# Patient Record
Sex: FEMALE | Race: AMERICAN INDIAN OR ALASKA NATIVE
[De-identification: names, ages, dates, MRNs, and addresses within clinical notes are randomized per-mention and may not be internally consistent; named-entity substitution may affect disease eponyms.]

---

## 2020-07-04 ENCOUNTER — HOSPITAL ENCOUNTER (EMERGENCY)
Dept: HOSPITAL 46 - ED | Age: 25
Discharge: LEFT BEFORE DISCHARGE | End: 2020-07-04
Payer: COMMERCIAL

## 2020-07-04 VITALS — BODY MASS INDEX: 24.83 KG/M2 | HEIGHT: 64 IN | WEIGHT: 145.44 LBS

## 2020-07-04 DIAGNOSIS — R10.13: ICD-10-CM

## 2020-07-04 DIAGNOSIS — R11.2: Primary | ICD-10-CM

## 2020-07-04 DIAGNOSIS — Z53.21: ICD-10-CM

## 2020-07-04 DIAGNOSIS — F17.200: ICD-10-CM

## 2021-10-30 ENCOUNTER — HOSPITAL ENCOUNTER (INPATIENT)
Dept: HOSPITAL 46 - FBCO | Age: 26
LOS: 3 days | Discharge: HOME | End: 2021-11-02
Attending: OBSTETRICS & GYNECOLOGY | Admitting: OBSTETRICS & GYNECOLOGY
Payer: COMMERCIAL

## 2021-10-30 DIAGNOSIS — Z20.822: ICD-10-CM

## 2021-10-30 DIAGNOSIS — O42.92: ICD-10-CM

## 2021-10-30 DIAGNOSIS — Z3A.38: ICD-10-CM

## 2021-10-30 PROCEDURE — A9270 NON-COVERED ITEM OR SERVICE: HCPCS

## 2021-10-30 PROCEDURE — U0003 INFECTIOUS AGENT DETECTION BY NUCLEIC ACID (DNA OR RNA); SEVERE ACUTE RESPIRATORY SYNDROME CORONAVIRUS 2 (SARS-COV-2) (CORONAVIRUS DISEASE [COVID-19]), AMPLIFIED PROBE TECHNIQUE, MAKING USE OF HIGH THROUGHPUT TECHNOLOGIES AS DESCRIBED BY CMS-2020-01-R: HCPCS

## 2021-10-30 PROCEDURE — C9803 HOPD COVID-19 SPEC COLLECT: HCPCS

## 2021-10-30 NOTE — OR
[facility]
                                    [address], [place]  [address]
_________________________________________________________________________________________
                                                                 Draft    
 
 
DATE OF OPERATION:
10/31/2021
 
SURGEON:
Margi Bush MD
 
FIRST ASSISTANT:
Díaz.
 
PREOPERATIVE DIAGNOSES:
Term pregnancy, premature rupture of membrane, and fetal intolerance to labor.
 
POSTOPERATIVE DIAGNOSES:
Term pregnancy, premature rupture of membrane, and fetal intolerance to labor, delivered.
 
PROCEDURE:
Primary  section with low segment transverse uterine incision.
 
ANESTHESIA:
Epidural.
 
ESTIMATED BLOOD LOSS:
500 mL.
 
DRAINS:
Harmon catheter.
 
INDICATIONS AND FINDINGS:
The patient is a 26-year-old female,  2, para 0, ectopic 1, admitted at 39 and
4/7th weeks with spontaneous rupture of membranes and very rarely labor.  She was
initially observed over the first 6 hours after rupture without onset of labor.  At that
point, Pitocin augmentation was begun.  She also received an epidural.  Fetal status was
overall very reassuring until she developed more regular contractions where she had
episodes of variables and prolonged D cells.  An amnioinfusion was begun but was not
helpful.  Following this, the Pitocin was stopped and she also received terbutaline.
She only progressed from 2.5 cm to 4.5 cm over 12 hours of rupture and the decision was
made to proceed with  section for the fetal status.  She was taken to the
operating room where she was delivered of a little girl via lower segment transverse
uterine incision with Apgars of 9 and 9 and weight of 6 pounds 13 ounces.  She was LOP
in position.  The uterus, tubes, ovaries, and placenta appeared normal. 
 
PROCEDURE IN DETAIL:
 
                                                                                    
_________________________________________________________________________________________
PATIENT NAME:     ALISSA CRAIN          
MEDICAL RECORD #: [id number]            OPERATIVE REPORT              
          ACCT #: [id number]  
DATE OF BIRTH:   [birth year]            REPORT #: [id number]      
PHYSICIAN:        MARGI BUSH MD            
PCP:              KARMEN RODRÍGUEZ             
REPORT IS CONFIDENTIAL AND NOT TO BE RELEASED WITHOUT AUTHORIZATION
 
 
                                  [facility]
                                    [address], [place]  [address]
_________________________________________________________________________________________
                                                                 Draft    
 
 
The patient was prepped and draped in the supine position.  A Pfannenstiel skin incision
was made, carried down through the fascia.  The incision was extended laterally.  The
inferior and superior fascial flaps were then created.  The muscles were bluntly divided
and the peritoneum opened bluntly and the incision extended bluntly.  The Andrew
retractor was placed.  The uterine incision was made at the upper aspect of the
peritoneal reflection.  The baby was then delivered with the above findings and handed
off to the pediatric staff in attendance.  The placenta was expressed and the cavity
explored showing no remaining fragments.  The edges of the incision were identified.
The uterus was closed in 2 layers.  The first layer was a running locking stitch and the
second was a vertical imbricating stitch.  Bleeding points on the peritoneum were
controlled with cautery.  The retractor was then removed.  The peritoneum identified.
An ACell graft was laid over the lower segment to aid in healing.  The peritoneum was
then closed with a running suture of 3-0 Vicryl.  The muscles were brought together with
interrupted sutures of 0 Vicryl.  Bleeding points were controlled with cautery.  This
layer was irrigated and inspected and good hemostasis was noted.  ACell powder was
sprinkled over the muscles to aid in healing.  The fascia was then closed from each
angle to the midline with a running suture of 0 Vicryl.  The subcu space was irrigated
and bleeding points controlled with cautery.  The deep space was closed with interrupted
sutures of 3-0 Vicryl.  The skin was closed with staples.  All sponge and needle counts
were correct.  She tolerated the procedure well and was taken to the recovery room in
good condition. 
 
 
 
            ________________________________________
            MD ALLEY RendonW/VICL
Job #:  [id number]
DD:  10/31/2021 09:51:50
DT:  10/31/2021 15:52:35
 
cc:            Dr. Arjun Fernandez DO
 
 
Copies:  MARCELINO FERNANDEZ DO
~
 
 
 
                                                                                    
_________________________________________________________________________________________
PATIENT NAME:     ALISSA CRAIN [place]          
MEDICAL RECORD #: [id number]            OPERATIVE REPORT              
          ACCT #: [id number]  
DATE OF BIRTH:   [birth year]            REPORT #: [id number]      
PHYSICIAN:        MARGI BUSH MD            
PCP:              KARMEN RODRÍGUEZ             
REPORT IS CONFIDENTIAL AND NOT TO BE RELEASED WITHOUT AUTHORIZATION

## 2021-10-31 NOTE — PR
[facility]
                                    [address], [place]  [address]
_________________________________________________________________________________________
                                                                 Signed   
 
 
===================================
Progress Notes IP
===================================
Datetime Report Generated by CPN: 10/31/2021 08:27
   
PROGRESS NOTES:  [id number]
Impression:  Non-reassuring Fetal Heart Rate
Procedures:  Amnio Infusion
Plan:  Deliver- Section
Informed Consent Obtain:   Section Delivery; Risks, Benefits and Alternatives
   Discussed
VITAL SIGNS:  [id number]
Vital Signs:  Reviewed; Within Normal Limits
EXAM:  [id number]
Dilatation:  4.5
Effacement:  90
Station:  -2
Contractions:  q 3 min
MEMBRANES:  [id number]
Amniotic Fluid Color:  Clear
Comments:  Continued severe variables despite amnioinfusion and stopping pitocin.  I feel
   she needs C/S for delivery.  Subq terb has been helpful but this is not a long term
   solution to this issue.  PARQ for C/S done and pt concurs with plan.
FETUS A:  [id number]
FHR Baseline:  120
Variability:  Moderate 6-25bpm
Accelerations:  15X15
Decelerations:  Variable
FHR Category:  Category II
Presentation:  Vertex
Comments on Fetus A:  good variability and accels but with variables
FETUS B:  [id number]
Signing Physician:  Margi Bush MD
 
 
Copies:                                
~
 
 
 
 
 
 
*Electronically Signed*  10/31/21  0827  MARGI BUSH MD            
                                                                       
_________________________________________________________________________________________
PATIENT NAME:     ALISSA CRAIN         
MEDICAL RECORD #: [id number]                     PROGRESS NOTE                 
          ACCT #: [id number]  
DATE OF BIRTH:   [birth year]                                       
PHYSICIAN:   MARGI BUSH MD                   RPT #: [id number]
REPORT IS CONFIDENTIAL AND NOT TO BE RELEASED WITHOUT AUTHORIZATION

## 2021-10-31 NOTE — PR
[facility]
                                    [address] [facility]
                                  [place], [place]  [address]
_________________________________________________________________________________________
                                                                 Signed   
 
 
===================================
Progress Notes IP
===================================
Datetime Report Generated by CPN: 10/31/2021 07:57
   
PROGRESS NOTES:  [id number]
Impression:  Non-reassuring Fetal Heart Rate
Procedures:  Amnio Infusion
VITAL SIGNS:  [id number]
Vital Signs:  Reviewed; Within Normal Limits
EXAM:  [id number]
Dilatation:  4.5
Effacement:  90
Station:  -2
Contractions:  q 3 min
MEMBRANES:  [id number]
Amniotic Fluid Color:  Clear
Comments:  Now having recurrent variables with some prolonged decels.  Pitocin off and
   amnioinfusion begun.  It is unclear at this time if baby will tolerate labor and
   continued close observation is needed.
FETUS A:  [id number]
FHR Baseline:  120
Variability:  Moderate 6-25bpm
Accelerations:  15X15
Decelerations:  Variable
FHR Category:  Category II
Presentation:  Vertex
Comments on Fetus A:  good variability and accels but with variables
FETUS B:  [id number]
Signing Physician:  Margi Bush MD
 
 
Copies:                                
~
 
 
 
 
 
 
 
 
 
*Electronically Signed*  10/31/21  0757  MARGI BUSH MD            
                                                                       
_________________________________________________________________________________________
PATIENT NAME:     ALISSA CRAIN [place]         
MEDICAL RECORD #: [id number]                     PROGRESS NOTE                 
          ACCT #: [id number]  
DATE OF BIRTH:   [birth year]                                       
PHYSICIAN:   MARGI BUSH MD                   RPT #: [id number]
REPORT IS CONFIDENTIAL AND NOT TO BE RELEASED WITHOUT AUTHORIZATION

## 2021-10-31 NOTE — NUR
10/31/21 0951 Sofia Limon
0940: PT ARRIVES TO [facility]  AWAKE AND ALERT. RESP EVEN AND
UNLABORED ON RA, SATS GREATER THAN 94% PT DENIES ANY PAIN, TAP BLOCKS
PERFORMED IN OR PRIOR TO ARRIVING TO RM. PT DENIES NAUSEA. [facility] RN IN
RM WITH INFANT. PT MOTHER ALSO IN RM AT THIS TIME.
0950: INFANT TO LEFT BREAST, BP CUFF SWITCHED TO RIGHT ARM. PT CONT TO
DO WELL ON RA.

## 2021-11-01 NOTE — PR
[facility]
                                    [address] [facility]
                                  [place], [place]  [address]
_________________________________________________________________________________________
                                                                 Signed   
 
 
===================================
PP Progress Notes
===================================
Datetime Report Generated by CPN: 2021 08:47
   
SUBJECTIVE:  [id number]
Pain:  Within Normal Limits
Nausea/Vomiting:  Denies
Flatus:  Yes
Vital Signs:  [id number]
Vital Signs:  Reviewed; Within Normal Limits
Cardiovascular:  Normal
Respiratory:  Normal
Abdomen/Uterus:  Abnormal
Lochia:  Normal
Vulva/Perineum:  Not Done
Breasts:  Not Done
CVA Tenderness:  Not Done
Extremities:  Normal
 Incision:  Normal
Breastfeeding Progress:  Abnormal
Exam Comments:  Active BS.  Fundus firm, NT @ U-2.
      
   9.7/31.1, WBC 17.6, plat 244k
IMPRESSION/PLAN/PROCEDURES:  [id number]
Impression:  Normal Postpartum Progression
Other Plans:  ambulate, shower
Progress Notes:  Doing well.  Encouraged to breast feed to mitigate any withdrawal
   symptoms.  
Signing Physician:  Margi Bush MD
 
 
Copies:                                
~
 
 
 
 
 
 
 
 
 
*Electronically Signed*  21  0847  MARGI BUSH MD            
                                                                       
_________________________________________________________________________________________
PATIENT NAME:     ALISSA CRAIN [place]         
MEDICAL RECORD #: [id number]                     PROGRESS NOTE                 
          ACCT #: [id number]  
DATE OF BIRTH:   [birth year]                                       
PHYSICIAN:   MARGI BUSH MD                   RPT #: [id number]
REPORT IS CONFIDENTIAL AND NOT TO BE RELEASED WITHOUT AUTHORIZATION

## 2021-11-02 NOTE — PR
[facility]
                                    [address] [facility]
                                  [place], [place]  [address]
_________________________________________________________________________________________
                                                                 Signed   
 
 
===================================
PP Progress Notes
===================================
Datetime Report Generated by CPN: 2021 07:46
   
SUBJECTIVE:  [id number]
Pain:  Within Normal Limits
Nausea/Vomiting:  Denies
Flatus:  Yes
Vital Signs:  [id number]
Vital Signs:  Reviewed; Within Normal Limits
Notable Details:  rare elevated BP
Cardiovascular:  Not Done
Respiratory:  Not Done
Abdomen/Uterus:  Abnormal
Lochia:  Normal
Vulva/Perineum:  Not Done
Breasts:  Not Done
CVA Tenderness:  Not Done
Extremities:  Normal
 Incision:  Normal
Breastfeeding Progress:  Abnormal
Exam Comments:  Abdomen with active BS.  Fundus firm, NT @ U-2.
IMPRESSION/PLAN/PROCEDURES:  [id number]
Impression:  Normal Postpartum Progression; Breastfeeding Difficulties
Plan:  Remove Staples; Discharge
Other Plans:  ambulate, shower
Procedures:  None
Progress Notes:  Doing well overall except little breast feeding/pumping.  She is ready
   for D/C to boarder status.
Signing Physician:  Margi Bush MD
 
 
Copies:                                
~
 
 
 
 
 
 
 
 
*Electronically Signed*  21  0746  MARGI BUSH MD            
                                                                       
_________________________________________________________________________________________
PATIENT NAME:     ALISSA CRAIN [place]         
MEDICAL RECORD #: [id number]                     PROGRESS NOTE                 
          ACCT #: [id number]  
DATE OF BIRTH:   [birth year]                                       
PHYSICIAN:   MARGI BUSH MD                   RPT #: [id number]
REPORT IS CONFIDENTIAL AND NOT TO BE RELEASED WITHOUT AUTHORIZATION